# Patient Record
Sex: MALE | Race: WHITE | NOT HISPANIC OR LATINO | Employment: FULL TIME | URBAN - METROPOLITAN AREA
[De-identification: names, ages, dates, MRNs, and addresses within clinical notes are randomized per-mention and may not be internally consistent; named-entity substitution may affect disease eponyms.]

---

## 2023-03-21 PROBLEM — H18.891 CORNEAL IRRITATION OF RIGHT EYE: Status: ACTIVE | Noted: 2023-03-21

## 2023-03-21 RX ORDER — LORATADINE 10 MG/1
CAPSULE, LIQUID FILLED ORAL
COMMUNITY

## 2024-08-20 ENCOUNTER — APPOINTMENT (OUTPATIENT)
Dept: PRIMARY CARE | Facility: CLINIC | Age: 29
End: 2024-08-20
Payer: COMMERCIAL

## 2024-08-20 VITALS
DIASTOLIC BLOOD PRESSURE: 84 MMHG | WEIGHT: 184 LBS | RESPIRATION RATE: 16 BRPM | HEART RATE: 83 BPM | BODY MASS INDEX: 25.76 KG/M2 | SYSTOLIC BLOOD PRESSURE: 124 MMHG | HEIGHT: 71 IN | TEMPERATURE: 97.4 F

## 2024-08-20 DIAGNOSIS — Z82.49 FAMILY HISTORY OF EARLY CAD: ICD-10-CM

## 2024-08-20 DIAGNOSIS — Z00.00 ENCOUNTER FOR ANNUAL PHYSICAL EXAM: Primary | ICD-10-CM

## 2024-08-20 DIAGNOSIS — Z13.31 SCREENING FOR DEPRESSION: ICD-10-CM

## 2024-08-20 PROBLEM — H18.891 CORNEAL IRRITATION OF RIGHT EYE: Status: RESOLVED | Noted: 2023-03-21 | Resolved: 2024-08-20

## 2024-08-20 PROCEDURE — 96127 BRIEF EMOTIONAL/BEHAV ASSMT: CPT | Performed by: FAMILY MEDICINE

## 2024-08-20 PROCEDURE — 99385 PREV VISIT NEW AGE 18-39: CPT | Performed by: FAMILY MEDICINE

## 2024-08-20 ASSESSMENT — ENCOUNTER SYMPTOMS
SEIZURES: 0
VOMITING: 0
DYSPHORIC MOOD: 0
DIFFICULTY URINATING: 0
BACK PAIN: 0
NERVOUS/ANXIOUS: 0
DIARRHEA: 0
FATIGUE: 0
FEVER: 0
COUGH: 0
BRUISES/BLEEDS EASILY: 0
RHINORRHEA: 0
NAUSEA: 0
BLOOD IN STOOL: 0
SHORTNESS OF BREATH: 0
PALPITATIONS: 0
CONSTIPATION: 0
WHEEZING: 0
NECK PAIN: 0
ARTHRALGIAS: 1
HEMATURIA: 0
SORE THROAT: 0

## 2024-08-20 ASSESSMENT — PATIENT HEALTH QUESTIONNAIRE - PHQ9
2. FEELING DOWN, DEPRESSED OR HOPELESS: NOT AT ALL
SUM OF ALL RESPONSES TO PHQ9 QUESTIONS 1 & 2: 0
1. LITTLE INTEREST OR PLEASURE IN DOING THINGS: NOT AT ALL

## 2024-08-20 NOTE — PROGRESS NOTES
"Patient reported health Good    Regular Dental Visits yes    Regular Eye Visits yes    Hearing Loss no    Balanced Diet yes    Weight Concerns no    Exercise Regular     Subjective   Patient ID: Lopez Jacobo is a 29 y.o. male who presents for Annual Exam.    HPI     Review of Systems   Constitutional:  Negative for fatigue and fever.   HENT:  Negative for congestion, ear pain, hearing loss, rhinorrhea and sore throat.    Eyes:  Negative for visual disturbance.        2022 seen eye doc for possible FB , had complete eye exam and was normal  Subsequent eye exams have also been normal   Respiratory:  Negative for cough, shortness of breath and wheezing.    Cardiovascular:  Negative for chest pain and palpitations.        Fh of early mi in mult fam memb   Gastrointestinal:  Negative for blood in stool, constipation, diarrhea, nausea and vomiting.   Endocrine: Negative for cold intolerance and heat intolerance.   Genitourinary:  Negative for difficulty urinating, hematuria, scrotal swelling and testicular pain.   Musculoskeletal:  Positive for arthralgias. Negative for back pain and neck pain.        Seen at [facility] for cyst on right thumb.  Will monitor for now . Will consider surg if worsens   Skin:  Negative for rash.   Neurological:  Negative for seizures and syncope.   Hematological:  Does not bruise/bleed easily.   Psychiatric/Behavioral:  Negative for dysphoric mood and suicidal ideas. The patient is not nervous/anxious.        Objective   /84   Pulse 83   Temp 36.3 °C (97.4 °F)   Resp 16   Ht 1.803 m (5' 11\")   Wt 83.5 kg (184 lb)   BMI 25.66 kg/m²     Physical Exam  Vitals and nursing note reviewed.   Constitutional:       General: He is not in acute distress.     Appearance: Normal appearance.   HENT:      Head: Normocephalic and atraumatic.      Right Ear: Tympanic membrane, ear canal and external ear normal.      Left Ear: Tympanic membrane, ear canal and external ear normal.      Nose: Nose " normal.      Mouth/Throat:      Mouth: Mucous membranes are moist.      Pharynx: Oropharynx is clear.   Eyes:      Extraocular Movements: Extraocular movements intact.      Conjunctiva/sclera: Conjunctivae normal.      Pupils: Pupils are equal, round, and reactive to light.      Comments: No FB seen in right eye on visual exam   Cardiovascular:      Rate and Rhythm: Normal rate and regular rhythm.      Heart sounds: Normal heart sounds.   Pulmonary:      Effort: Pulmonary effort is normal.      Breath sounds: Normal breath sounds.   Abdominal:      General: Abdomen is flat. Bowel sounds are normal.      Palpations: Abdomen is soft.   Musculoskeletal:         General: No deformity.      Cervical back: Neck supple.   Lymphadenopathy:      Cervical: No cervical adenopathy.   Skin:     General: Skin is warm and dry.   Neurological:      General: No focal deficit present.      Mental Status: He is alert.      Sensory: No sensory deficit.      Motor: No weakness.      Gait: Gait normal.   Psychiatric:         Mood and Affect: Mood normal.         Behavior: Behavior normal.         Assessment/Plan   Problem List Items Addressed This Visit             ICD-10-CM    Encounter for annual physical exam - Primary Z00.00    Relevant Orders    CBC    Comprehensive Metabolic Panel    Lipid Panel    TSH with reflex to Free T4 if abnormal    Vitamin D 25-Hydroxy,Total    Follow Up In [facility] - PCP - Health Maintenance    Hemoglobin A1C    Screening for depression Z13.31    Family history of early CAD Z82.49    Relevant Orders    Referral to Cardiology

## 2024-08-22 ENCOUNTER — LAB (OUTPATIENT)
Dept: LAB | Facility: LAB | Age: 29
End: 2024-08-22
Payer: COMMERCIAL

## 2024-08-22 DIAGNOSIS — Z00.00 ENCOUNTER FOR ANNUAL PHYSICAL EXAM: ICD-10-CM

## 2024-08-22 LAB
25(OH)D3 SERPL-MCNC: 31 NG/ML (ref 30–100)
ALBUMIN SERPL BCP-MCNC: 4.7 G/DL (ref 3.4–5)
ALP SERPL-CCNC: 69 U/L (ref 33–120)
ALT SERPL W P-5'-P-CCNC: 25 U/L (ref 10–52)
ANION GAP SERPL CALC-SCNC: 11 MMOL/L (ref 10–20)
AST SERPL W P-5'-P-CCNC: 18 U/L (ref 9–39)
BILIRUB SERPL-MCNC: 0.8 MG/DL (ref 0–1.2)
BUN SERPL-MCNC: 16 MG/DL (ref 6–23)
CALCIUM SERPL-MCNC: 9.6 MG/DL (ref 8.6–10.3)
CHLORIDE SERPL-SCNC: 102 MMOL/L (ref 98–107)
CHOLEST SERPL-MCNC: 247 MG/DL (ref 0–199)
CHOLESTEROL/HDL RATIO: 5.3
CO2 SERPL-SCNC: 30 MMOL/L (ref 21–32)
CREAT SERPL-MCNC: 1.05 MG/DL (ref 0.5–1.3)
EGFRCR SERPLBLD CKD-EPI 2021: >90 ML/MIN/1.73M*2
ERYTHROCYTE [DISTWIDTH] IN BLOOD BY AUTOMATED COUNT: 12.3 % (ref 11.5–14.5)
EST. AVERAGE GLUCOSE BLD GHB EST-MCNC: 100 MG/DL
GLUCOSE SERPL-MCNC: 87 MG/DL (ref 74–99)
HBA1C MFR BLD: 5.1 %
HCT VFR BLD AUTO: 46.8 % (ref 41–52)
HDLC SERPL-MCNC: 46.4 MG/DL
HGB BLD-MCNC: 15.7 G/DL (ref 13.5–17.5)
LDLC SERPL CALC-MCNC: 178 MG/DL
MCH RBC QN AUTO: 31.3 PG (ref 26–34)
MCHC RBC AUTO-ENTMCNC: 33.5 G/DL (ref 32–36)
MCV RBC AUTO: 93 FL (ref 80–100)
NON HDL CHOLESTEROL: 201 MG/DL (ref 0–149)
NRBC BLD-RTO: 0 /100 WBCS (ref 0–0)
PLATELET # BLD AUTO: 243 X10*3/UL (ref 150–450)
POTASSIUM SERPL-SCNC: 5.1 MMOL/L (ref 3.5–5.3)
PROT SERPL-MCNC: 7.6 G/DL (ref 6.4–8.2)
RBC # BLD AUTO: 5.01 X10*6/UL (ref 4.5–5.9)
SODIUM SERPL-SCNC: 138 MMOL/L (ref 136–145)
T4 FREE SERPL-MCNC: 0.68 NG/DL (ref 0.61–1.12)
TRIGL SERPL-MCNC: 114 MG/DL (ref 0–149)
TSH SERPL-ACNC: 5.36 MIU/L (ref 0.44–3.98)
VLDL: 23 MG/DL (ref 0–40)
WBC # BLD AUTO: 5.4 X10*3/UL (ref 4.4–11.3)

## 2024-08-22 PROCEDURE — 83036 HEMOGLOBIN GLYCOSYLATED A1C: CPT

## 2024-08-22 PROCEDURE — 36415 COLL VENOUS BLD VENIPUNCTURE: CPT

## 2024-08-22 PROCEDURE — 85027 COMPLETE CBC AUTOMATED: CPT

## 2024-08-22 PROCEDURE — 82306 VITAMIN D 25 HYDROXY: CPT

## 2024-08-22 PROCEDURE — 80061 LIPID PANEL: CPT

## 2024-08-22 PROCEDURE — 80053 COMPREHEN METABOLIC PANEL: CPT

## 2024-08-22 PROCEDURE — 84439 ASSAY OF FREE THYROXINE: CPT

## 2024-08-22 PROCEDURE — 84443 ASSAY THYROID STIM HORMONE: CPT

## 2024-08-26 ENCOUNTER — TELEPHONE (OUTPATIENT)
Dept: PRIMARY CARE | Facility: CLINIC | Age: 29
End: 2024-08-26
Payer: COMMERCIAL

## 2024-08-26 DIAGNOSIS — E78.5 HYPERLIPIDEMIA, UNSPECIFIED HYPERLIPIDEMIA TYPE: ICD-10-CM

## 2024-08-26 RX ORDER — ATORVASTATIN CALCIUM 10 MG/1
10 TABLET, FILM COATED ORAL DAILY
Qty: 30 TABLET | Refills: 0 | Status: SHIPPED | OUTPATIENT
Start: 2024-08-26

## 2024-08-26 RX ORDER — ATORVASTATIN CALCIUM 10 MG/1
10 TABLET, FILM COATED ORAL DAILY
COMMUNITY
End: 2024-08-26 | Stop reason: SDUPTHER

## 2024-08-26 NOTE — TELEPHONE ENCOUNTER
----- Message from Raegan SWEENEY sent at 8/26/2024  1:22 PM EDT -----  Sent message in [employer] pt aware. Medication ordered and sent to GP, labs in the system.  ----- Message -----  From: Tracie Ferris MD  Sent: 8/22/2024   2:24 PM EDT  To: Raegan Rocha, CMA    Call pt, has high chol and LDL start atorvastatin 10 mg daily, eat low fat/chol diet and exercise most days  Recheck lipids in 2 mo

## 2024-08-26 NOTE — TELEPHONE ENCOUNTER
Caregiver requests prescription below    Order medication per your message     Last Office Visit: 8/20/2024   Next Office Visit: Visit date not found     Requested Prescriptions     Pending Prescriptions Disp Refills    atorvastatin (Lipitor) 10 mg tablet 30 tablet 0     Sig: Take 1 tablet (10 mg) by mouth once daily.

## 2024-08-26 NOTE — TELEPHONE ENCOUNTER
----- Message from Tracie Ferris sent at 8/22/2024  2:24 PM EDT -----  Call pt, has high chol and LDL start atorvastatin 10 mg daily, eat low fat/chol diet and exercise most days  Recheck lipids in 2 mo

## 2024-10-07 ENCOUNTER — APPOINTMENT (OUTPATIENT)
Dept: CARDIOLOGY | Facility: CLINIC | Age: 29
End: 2024-10-07
Payer: COMMERCIAL

## 2024-10-07 VITALS
SYSTOLIC BLOOD PRESSURE: 128 MMHG | WEIGHT: 183.3 LBS | HEIGHT: 72 IN | HEART RATE: 72 BPM | DIASTOLIC BLOOD PRESSURE: 80 MMHG | BODY MASS INDEX: 24.83 KG/M2

## 2024-10-07 DIAGNOSIS — R07.89 CHEST DISCOMFORT: ICD-10-CM

## 2024-10-07 DIAGNOSIS — Z78.9 NEVER SMOKED TOBACCO: ICD-10-CM

## 2024-10-07 DIAGNOSIS — R00.2 PALPITATIONS: ICD-10-CM

## 2024-10-07 DIAGNOSIS — Z82.49 FAMILY HISTORY OF EARLY CAD: ICD-10-CM

## 2024-10-07 DIAGNOSIS — Z76.89 ENCOUNTER TO ESTABLISH CARE WITH NEW DOCTOR: ICD-10-CM

## 2024-10-07 DIAGNOSIS — Z76.89 ENCOUNTER TO ESTABLISH CARE: Primary | ICD-10-CM

## 2024-10-07 DIAGNOSIS — E78.2 MIXED HYPERLIPIDEMIA: ICD-10-CM

## 2024-10-07 PROCEDURE — 1036F TOBACCO NON-USER: CPT | Performed by: INTERNAL MEDICINE

## 2024-10-07 PROCEDURE — 99204 OFFICE O/P NEW MOD 45 MIN: CPT | Performed by: INTERNAL MEDICINE

## 2024-10-07 PROCEDURE — 93000 ELECTROCARDIOGRAM COMPLETE: CPT | Performed by: INTERNAL MEDICINE

## 2024-10-07 PROCEDURE — 3008F BODY MASS INDEX DOCD: CPT | Performed by: INTERNAL MEDICINE

## 2024-10-07 NOTE — PATIENT INSTRUCTIONS
Patient to follow up after testing with Dr. Jean Carlos MD     Office will arrange Graded Exercise Stress Test and Echo in near future.     No other changes today.   Continue same medications and treatments.   Patient educated on proper medication use.   Patient educated on risk factor modification.   Please bring any lab results from other providers / physicians to your next appointment.     Please bring all medicines, vitamins, and herbal supplements with you when you come to the office.     Prescriptions will not be filled unless you are compliant with your follow up appointments or have a follow up appointment scheduled as per instruction of your physician. Refills should be requested at the time of your visit.    IGerson RN am scribing for and in the presence of Dr. Satish Evangelista MD

## 2024-10-07 NOTE — PROGRESS NOTES
CARDIOLOGY NEW PATIENT OFFICE VISIT    Patient:  Lopez Jacobo  YOB: 1995  MRN: [id number]       Chief Complaint/Active Symptoms:       Lopez Jacobo is a 29 y.o. male who is being seen today at the request of Dr. Ferris for evaluation of family history of CAD.  Patient has no ongoing medical conditions.  Had a recent physical and was found to have hyperlipidemia.  His doctor asked him to start Lipitor which she has not done yet.  Patient due to his family history of significant CAD in both mom and dad decided he wanted to get a cardiology evaluation.  He does have an occasional palpitation.  He has an occasional pressure in his chest but works full-time as a  and does very well with this.  He is in good physical condition.  He is not overweight.  He does not smoke.  He only has social alcohol.  He has no other medical conditions as a child or as a young adult.    No other GI  renal otologic urological endocrine hematologic vascular pulmonary or cardiac abnormalities.    Social history non-smoker social drinker Works as a .    Family history includes both parents having coronary disease stents and father having myocardial infarction.    Medications currently none.    Allergies to medications none.    System review is otherwise noncontributory    Chief Complaint   Patient presents with    New Patient Visit     Per PCP due to family history of CAD       History of Present Illness:   HPI      Allergies:     No Known Allergies     Outpatient Medications:     Current Outpatient Medications   Medication Instructions    atorvastatin (LIPITOR) 10 mg, oral, Daily        Past Medical History:     No past medical history on file.    Social History:     Social History     Tobacco Use    Smoking status: Never    Smokeless tobacco: Never   Substance Use Topics    Alcohol use: Yes     Alcohol/week: 4.0 standard drinks of alcohol     Types: 4 Cans of beer per week     Comment: 4 beers weekly    Drug use:  Never       Family History:        Family History   Problem Relation Name Age of Onset    Heart disease Mother Jeny     Hypertension Mother Jeny     Blood clot Mother Jeny     Diabetes Mother Jeny     Heart attack Father Nate     Blood clot Father Nate     Hypertension Father Nate     Cancer Maternal Grandmother Merna        Review of Systems:     Review of Systems   All other systems reviewed and are negative.      Objective:     Vitals:    10/07/24 1427   BP: 128/80   Pulse: 72       Vitals:    10/07/24 1427   Weight: 83.1 kg (183 lb 4.8 oz)       Physical Examination:   Vitals reviewed.   Constitutional:       Appearance: Normal and healthy appearance. Well-developed and not in distress.   Eyes:      Conjunctiva/sclera: Conjunctivae normal.      Pupils: Pupils are equal, round, and reactive to light.   Neck:      Vascular: No JVR. JVD normal.   Pulmonary:      Effort: Pulmonary effort is normal.      Breath sounds: Normal breath sounds. No wheezing. No rhonchi. No rales.   Chest:      Chest wall: Not tender to palpatation.   Cardiovascular:      PMI at left midclavicular line. Normal rate. Regular rhythm. Normal S1. Normal S2.       Murmurs: There is no murmur.      No gallop.  No click. No rub.   Pulses:     Intact distal pulses.   Edema:     Peripheral edema absent.   Abdominal:      Tenderness: There is no abdominal tenderness.   Musculoskeletal: Normal range of motion.         General: No tenderness.      Cervical back: Normal range of motion. Skin:     General: Skin is warm and dry.   Neurological:      General: No focal deficit present.      Mental Status: Alert and oriented to person, place and time.   Psychiatric:         Behavior: Behavior is cooperative.            Lab:     CBC:   Lab Results   Component Value Date    WBC 5.4 08/22/2024    RBC 5.01 08/22/2024    HGB 15.7 08/22/2024    HCT 46.8 08/22/2024     08/22/2024        CMP:    Lab Results   Component Value Date     08/22/2024  "   K 5.1 08/22/2024     08/22/2024    CO2 30 08/22/2024    BUN 16 08/22/2024    CREATININE 1.05 08/22/2024    GLUCOSE 87 08/22/2024    CALCIUM 9.6 08/22/2024       Magnesium:    No results found for: \"MG\"    Lipid Profile:    Lab Results   Component Value Date    TRIG 114 08/22/2024    HDL 46.4 08/22/2024    LDLCALC 178 (H) 08/22/2024       TSH:    Lab Results   Component Value Date    TSH 5.36 (H) 08/22/2024       BNP:   No results found for: \"BNP\"     PT/INR:    No results found for: \"PROTIME\", \"INR\"    HgBA1c:    Lab Results   Component Value Date    HGBA1C 5.1 08/22/2024       BMP:  Lab Results   Component Value Date     08/22/2024    K 5.1 08/22/2024     08/22/2024    CO2 30 08/22/2024    BUN 16 08/22/2024    CREATININE 1.05 08/22/2024       CBC:  Lab Results   Component Value Date    WBC 5.4 08/22/2024    RBC 5.01 08/22/2024    HGB 15.7 08/22/2024    HCT 46.8 08/22/2024    MCV 93 08/22/2024    MCH 31.3 08/22/2024    MCHC 33.5 08/22/2024    RDW 12.3 08/22/2024     08/22/2024       Cardiac Enzymes:    No results found for: \"TROPHS\"    Hepatic Function Panel:    Lab Results   Component Value Date    ALKPHOS 69 08/22/2024    ALT 25 08/22/2024    AST 18 08/22/2024    PROT 7.6 08/22/2024    BILITOT 0.8 08/22/2024         Diagnostic Studies:     EKG:  Normal sinus rhythm  Normal tracing    Radiology:     No orders to display       Assessment/Plan:     Diagnoses and all orders for this visit:  Encounter to establish care  Family history of early CAD  -     Referral to Cardiology  BMI 24.0-24.9, adult  Never smoked tobacco  Encounter to establish care with new doctor  Mixed hyperlipidemia  Chest discomfort        Assessment:   29-year-old gentleman here for routine cardiac assessment and evaluation as a new patient.    Meds, vitals, examination as noted.    Chart reviewed in detail discussed the patient at length.    Impression:    Diagnoses and all orders for this visit:  Encounter to " establish care  Family history of early CAD  -     Referral to Cardiology  BMI 24.0-24.9, adult  Never smoked tobacco  Encounter to establish care with new doctor  Mixed hyperlipidemia  Chest discomfort      Plan:   Recommendation:  Patient does have some minor symptomatology which are atypical  Will do routine treadmill stress test and echo  Will recheck lipids and decide if his current dietary changes and measures have adjusted accordingly and if not then he can start Lipitor as prescribed by his primary doctor  He will see me after the testing to review make any additional recommendations  Continue normal and every day regular activities for now

## 2024-10-08 ENCOUNTER — TELEPHONE (OUTPATIENT)
Dept: CARDIOLOGY | Facility: CLINIC | Age: 29
End: 2024-10-08
Payer: COMMERCIAL

## 2024-10-08 DIAGNOSIS — E78.5 HYPERLIPIDEMIA, UNSPECIFIED HYPERLIPIDEMIA TYPE: ICD-10-CM

## 2024-10-08 NOTE — TELEPHONE ENCOUNTER
Called and left a vm about next appt for echo and stress test on 11/14 starting at 12:30 pm and fu on 11/21. Adv if days and times dont fit to call and reschedule.

## 2024-10-08 NOTE — TELEPHONE ENCOUNTER
Requested Prescriptions     Pending Prescriptions Disp Refills    atorvastatin (Lipitor) 10 mg tablet 30 tablet 0     Sig: Take 1 tablet (10 mg) by mouth once daily.

## 2024-10-09 RX ORDER — ATORVASTATIN CALCIUM 10 MG/1
10 TABLET, FILM COATED ORAL DAILY
Qty: 30 TABLET | Refills: 0 | Status: SHIPPED | OUTPATIENT
Start: 2024-10-09

## 2024-11-21 ENCOUNTER — APPOINTMENT (OUTPATIENT)
Dept: CARDIOLOGY | Facility: CLINIC | Age: 29
End: 2024-11-21
Payer: COMMERCIAL